# Patient Record
Sex: MALE | Race: WHITE
[De-identification: names, ages, dates, MRNs, and addresses within clinical notes are randomized per-mention and may not be internally consistent; named-entity substitution may affect disease eponyms.]

---

## 2021-12-26 ENCOUNTER — HOSPITAL ENCOUNTER (EMERGENCY)
Dept: HOSPITAL 11 - JP.ED | Age: 41
Discharge: HOME | End: 2021-12-26
Payer: MEDICAID

## 2021-12-26 DIAGNOSIS — F10.230: ICD-10-CM

## 2021-12-26 DIAGNOSIS — R56.9: Primary | ICD-10-CM
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
## 2021-12-26 PROCEDURE — 70450 CT HEAD/BRAIN W/O DYE: CPT

## 2021-12-26 PROCEDURE — 80307 DRUG TEST PRSMV CHEM ANLYZR: CPT

## 2021-12-26 PROCEDURE — 36415 COLL VENOUS BLD VENIPUNCTURE: CPT

## 2021-12-26 PROCEDURE — 80053 COMPREHEN METABOLIC PANEL: CPT

## 2021-12-26 PROCEDURE — 99285 EMERGENCY DEPT VISIT HI MDM: CPT

## 2021-12-26 PROCEDURE — 85025 COMPLETE CBC W/AUTO DIFF WBC: CPT

## 2021-12-26 NOTE — EDM.PDOC
ED HPI GENERAL MEDICAL PROBLEM





- General


Chief Complaint: Neurological Problem


Stated Complaint: SEIZURE


Time Seen by Provider: 12/26/21 16:30


Source of Information: Reports: Patient, Family


History Limitations: Reports: No Limitations





- History of Present Illness


INITIAL COMMENTS - FREE TEXT/NARRATIVE: 





pt stopped drinking cold turkey 4 days ago. Today he had a tonic-clonic seizure.

 He looks alert at this time. 


Onset: Today, Sudden


Duration: Hour(s):


Location: Reports: Generalized


Associated Symptoms: Reports: No Other Symptoms


  ** Head


Pain Score (Numeric/FACES): 3





- Related Data


                                    Allergies











Allergy/AdvReac Type Severity Reaction Status Date / Time


 


No Known Allergies Allergy   Verified 12/26/21 16:44











Home Meds: 


                                    Home Meds





NK [No Known Home Meds]  12/26/21 [History]











ED ROS GENERAL





- Review of Systems


Review Of Systems: See Below


Constitutional: Reports: No Symptoms


HEENT: Reports: No Symptoms


Respiratory: Reports: No Symptoms


Cardiovascular: Reports: No Symptoms


Endocrine: Reports: No Symptoms


GI/Abdominal: Reports: No Symptoms


: Reports: No Symptoms


Musculoskeletal: Reports: No Symptoms


Skin: Reports: No Symptoms


Neurological: Reports: Other (pt had a tonic clonic seizure. )


Psychiatric: Reports: Anxiety





- Physical Exam


Exam: See Below


Text/Narrative:: 





pt had a seizure after being 4 days with no etoh. pt is slightly shakey. he was 

given ativan 1 mg . 


Exam Limited By: No Limitations


General Appearance: Alert, Anxious, Other (pupils equal and reactive. )


Ears: Normal TMs


Nose: Normal Inspection


Throat/Mouth: Normal Inspection


Head Exam: Atraumatic


Neck: Normal Inspection


Respiratory/Chest: No Respiratory Distress


Cardiovascular: Regular Rate, Rhythm


GI/Abdominal: Soft, Non-Tender


 (Male) Exam: Deferred


Rectal (Males) Exam: Deferred


Neuro Exam (Abbreviated): Alert, Oriented, Normal Cognition


Back Exam: Normal Inspection


Extremities: Normal Inspection


Psychiatric: Anxious





Course





- Vital Signs


Last Recorded V/S: 


                                Last Vital Signs











Temp  36.8 C   12/26/21 16:32


 


Pulse  89   12/26/21 16:32


 


Resp  16   12/26/21 16:32


 


BP  116/74   12/26/21 16:32


 


Pulse Ox  94 L  12/26/21 16:32














- Orders/Labs/Meds


Orders: 


                               Active Orders 24 hr











 Category Date Time Status


 


 DRUG SCREEN, URINE [URCHEM] Stat Lab  12/26/21 16:19 Ordered


 


 UA W/MICROSCOPIC [URIN] Urgent Lab  12/26/21 16:18 Ordered











Labs: 


                                Laboratory Tests











  12/26/21 12/26/21 12/26/21 Range/Units





  16:00 16:00 16:30 


 


WBC    5.4  (4.5-11.0)  K/uL


 


RBC    4.21 L  (4.30-5.90)  M/uL


 


Hgb    15.8 H  (12.0-15.0)  g/dL


 


Hct    42.3  (40.0-54.0)  %


 


MCV    101 H  (80-98)  fL


 


MCH    38 H  (27-31)  pg


 


MCHC    37 H  (32-36)  %


 


Plt Count    144 L  (150-400)  K/uL


 


Neut % (Auto)    66.0  (36-66)  %


 


Lymph % (Auto)    16.8 L  (24-44)  %


 


Mono % (Auto)    14.4 H  (2-6)  %


 


Eos % (Auto)    2.2  (2-4)  %


 


Baso % (Auto)    0.6  (0-1)  %


 


Sodium   136 L   (140-148)  mmol/L


 


Potassium   3.8   (3.6-5.2)  mmol/L


 


Chloride   100   (100-108)  mmol/L


 


Carbon Dioxide   26   (21-32)  mmol/L


 


Anion Gap   13.8   (5.0-14.0)  mmol/L


 


BUN   8   (7-18)  mg/dL


 


Creatinine   1.0   (0.8-1.3)  mg/dL


 


Est Cr Clr Drug Dosing   105.62   mL/min


 


Estimated GFR (MDRD)   > 60   (>60)  


 


Glucose   105   ()  mg/dL


 


Calcium   9.0   (8.5-10.1)  mg/dL


 


Total Bilirubin   1.0   (0.2-1.0)  mg/dL


 


AST   109 H   (15-37)  U/L


 


ALT   112 H   (12-78)  U/L


 


Alkaline Phosphatase   90   ()  U/L


 


Total Protein   7.0   (6.4-8.2)  g/dL


 


Albumin   3.9   (3.4-5.0)  g/dL


 


Globulin   3.1   (2.3-3.5)  g/dL


 


Albumin/Globulin Ratio   1.3   (1.2-2.2)  


 


Ethyl Alcohol  < 3    mg/dL











Meds: 


Medications














Discontinued Medications














Generic Name Dose Route Start Last Admin





  Trade Name Jayson  PRN Reason Stop Dose Admin


 


Lorazepam  1 mg  12/26/21 16:30  12/26/21 16:43





  Lorazepam 1 Mg Tab  PO  12/26/21 16:31  1 mg





  ONETIME ONE   Administration














- Re-Assessments/Exams


Free Text/Narrative Re-Assessment/Exam: 





12/26/21 17:19


pt was given ativan 1 mg and he appears stablde. His lab work looks good. 


12/26/21 18:14


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 No acute findings. 





Departure





- Departure


Time of Disposition: 18:15


Disposition: Home, Self-Care 01


Condition: Fair


Clinical Impression: 


 Seizure, Alcohol withdrawal








- Discharge Information


Referrals: 


PCP,None [Primary Care Provider] - 


Forms:  ED Department Discharge


Care Plan Goals: 


pt should be with pt, seizure precautions, ativan 1 mg q8h for the nex 3 days. 





Sepsis Event Note (ED)





- Focused Exam


Vital Signs: 


                                   Vital Signs











  Temp Pulse Resp BP Pulse Ox


 


 12/26/21 16:32  36.8 C  89  16  116/74  94 L


 


 12/26/21 16:10  36.8 C  89  16  116/74  94 L














- My Orders


Last 24 Hours: 


My Active Orders





12/26/21 16:18


UA W/MICROSCOPIC [URIN] Urgent 





12/26/21 16:19


DRUG SCREEN, URINE [URCHEM] Stat 














- Assessment/Plan


Last 24 Hours: 


My Active Orders





12/26/21 16:18


UA W/MICROSCOPIC [URIN] Urgent 





12/26/21 16:19


DRUG SCREEN, URINE [URCHEM] Stat

## 2021-12-26 NOTE — CRLCT
For Patients:  As a result of the 21st Century Cures Act, medical imaging 

exams and procedure reports are released immediately into your electronic 

medical record.  You may view this report before your referring provider.  

If you have questions, please contact your health care provider.



INDICATION:



First-time seizure.



TECHNIQUE:



CT head without contrast.



COMPARISON:



None available. 



FINDINGS:



Cerebral parenchyma: No evidence of acute territorial infarct. Small 

chronic lacunar infarcts in the bilateral basal ganglia. No acute 

intraparenchymal hemorrhage. No significant mass effect/midline shift. 

Normal gray-white matter differentiation. 



Extra-axial spaces: No extra-axial collection or hemorrhage.



Ventricles: Unremarkable.



Calvarium: Intact.



Visualized paranasal sinuses/mastoid air cells: Mild mucosal thickening of 

the left sphenoid sinus.



Posterior fossa: No cerebellar tonsillar herniation.



Visualized orbits: Unremarkable.



IMPRESSION:



1. No acute intracranial abnormality.



2. Small chronic lacunar infarcts are noted in the bilateral basal ganglia.



Please note that all CT scans at this facility use dose modulation, 

iterative reconstruction, and/or weight-based dosing when appropriate to 

reduce radiation dose to as low as reasonably achievable.



Dictated by Kulwinder Mercado MD @ 12/26/2021 6:10:06 PM



(Electronically Signed)

## 2022-05-23 ENCOUNTER — HOSPITAL ENCOUNTER (EMERGENCY)
Dept: HOSPITAL 11 - JP.ED | Age: 42
Discharge: HOME | End: 2022-05-23
Payer: MEDICAID

## 2022-05-23 DIAGNOSIS — F17.210: ICD-10-CM
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
## 2022-05-23 DIAGNOSIS — S01.21XA: Primary | ICD-10-CM

## 2022-05-23 DIAGNOSIS — R56.9: ICD-10-CM

## 2022-05-23 DIAGNOSIS — S00.83XA: ICD-10-CM

## 2023-03-15 ENCOUNTER — HOSPITAL ENCOUNTER (EMERGENCY)
Dept: HOSPITAL 11 - JP.ED | Age: 43
Discharge: HOME | End: 2023-03-15
Payer: MEDICAID

## 2023-03-15 DIAGNOSIS — K21.9: ICD-10-CM

## 2023-03-15 DIAGNOSIS — J02.9: ICD-10-CM

## 2023-03-15 DIAGNOSIS — J20.8: Primary | ICD-10-CM

## 2023-03-15 DIAGNOSIS — Z72.0: ICD-10-CM

## 2023-03-15 DIAGNOSIS — E66.9: ICD-10-CM

## 2023-03-15 DIAGNOSIS — Z20.822: ICD-10-CM

## 2023-03-15 DIAGNOSIS — J43.9: ICD-10-CM

## 2023-03-15 LAB — SARS-COV-2 RNA RESP QL NAA+PROBE: NEGATIVE

## 2023-03-15 PROCEDURE — 71046 X-RAY EXAM CHEST 2 VIEWS: CPT

## 2023-03-15 PROCEDURE — 99285 EMERGENCY DEPT VISIT HI MDM: CPT

## 2023-03-15 PROCEDURE — 87081 CULTURE SCREEN ONLY: CPT

## 2023-03-15 PROCEDURE — 0241U: CPT

## 2023-03-15 PROCEDURE — 87880 STREP A ASSAY W/OPTIC: CPT
